# Patient Record
Sex: MALE | Race: WHITE | NOT HISPANIC OR LATINO | Employment: STUDENT | URBAN - METROPOLITAN AREA
[De-identification: names, ages, dates, MRNs, and addresses within clinical notes are randomized per-mention and may not be internally consistent; named-entity substitution may affect disease eponyms.]

---

## 2023-05-03 ENCOUNTER — HOSPITAL ENCOUNTER (OUTPATIENT)
Dept: GENERAL RADIOLOGY | Facility: CLINIC | Age: 18
Discharge: HOME | End: 2023-05-03
Attending: PLASTIC SURGERY | Admitting: PLASTIC SURGERY
Payer: COMMERCIAL

## 2023-05-03 DIAGNOSIS — S62.336A DISPLACED FRACTURE OF NECK OF FIFTH METACARPAL BONE, RIGHT HAND, INITIAL ENCOUNTER FOR CLOSED FRACTURE: ICD-10-CM

## 2023-05-03 PROCEDURE — 73130 X-RAY EXAM OF HAND: CPT | Mod: 26 | Performed by: RADIOLOGY

## 2023-05-03 PROCEDURE — 73130 X-RAY EXAM OF HAND: CPT | Mod: RT

## 2024-01-24 ENCOUNTER — OFFICE VISIT (OUTPATIENT)
Dept: FAMILY MEDICINE | Facility: CLINIC | Age: 19
End: 2024-01-24
Payer: COMMERCIAL

## 2024-01-24 VITALS
BODY MASS INDEX: 27.83 KG/M2 | WEIGHT: 210 LBS | RESPIRATION RATE: 16 BRPM | OXYGEN SATURATION: 98 % | HEIGHT: 73 IN | DIASTOLIC BLOOD PRESSURE: 77 MMHG | TEMPERATURE: 98.3 F | SYSTOLIC BLOOD PRESSURE: 120 MMHG | HEART RATE: 74 BPM

## 2024-01-24 DIAGNOSIS — Z13.228 SCREENING FOR METABOLIC DISORDER: ICD-10-CM

## 2024-01-24 DIAGNOSIS — Z13.220 LIPID SCREENING: ICD-10-CM

## 2024-01-24 DIAGNOSIS — Z00.00 ROUTINE GENERAL MEDICAL EXAMINATION AT A HEALTH CARE FACILITY: Primary | ICD-10-CM

## 2024-01-24 PROCEDURE — 80061 LIPID PANEL: CPT | Performed by: FAMILY MEDICINE

## 2024-01-24 PROCEDURE — 80048 BASIC METABOLIC PNL TOTAL CA: CPT | Performed by: FAMILY MEDICINE

## 2024-01-24 ASSESSMENT — ENCOUNTER SYMPTOMS
NERVOUS/ANXIOUS: 0
DYSURIA: 0
HEMATOCHEZIA: 0
FEVER: 0
ABDOMINAL PAIN: 0
MYALGIAS: 0
JOINT SWELLING: 0
HEADACHES: 0
HEMATURIA: 0
DIZZINESS: 0
EYE PAIN: 0
SHORTNESS OF BREATH: 0
CHILLS: 0
SORE THROAT: 0
BREAST MASS: 0
WEAKNESS: 0
FREQUENCY: 0
COUGH: 0
PALPITATIONS: 0
HEARTBURN: 0
NAUSEA: 0
PARESTHESIAS: 0
CONSTIPATION: 0
DIARRHEA: 0
ARTHRALGIAS: 0

## 2024-01-24 NOTE — PROGRESS NOTES
"Preventive Care Visit  [facility]  Gregg Robertson MD, Family Medicine  Jan 24, 2024      SUBJECTIVE:   Gretel is a 18 year old, presenting for the following:  Physical      Healthy Habits:     Getting at least 3 servings of Calcium per day:  Yes    Bi-annual eye exam:  NO    Dental care twice a year:  Yes    Sleep apnea or symptoms of sleep apnea:  None    Diet:  Regular (no restrictions)    Frequency of exercise:  6-7 days/week    Duration of exercise:  Greater than 60 minutes    Taking medications regularly:  Yes    Medication side effects:  None    Additional concerns today:  Yes    # Health Maintenance  New Patient  Establish Care  - BP:   BP Readings from Last 3 Encounters:   01/24/24 120/77   - Cholesterol: pending  No results for input(s): \"CHOL\", \"HDL\", \"LDL\", \"TRIG\" in the last 45409 hours.The ASCVD Risk score (Ranulfo ARIAS, et al., 2019) failed to calculate for the following reasons:    The 2019 ASCVD risk score is only valid for ages 40 to 79  - Diabetes Screening: pending  - Lung Cancer Screening: not indicated  55-81yo w/30py smoking history and currently smoking OR quit within past 15 years:  Low dose CT annually and discontinued once a person has been 15 years tobacco free  - (+) seatbelt use, (+) helmet, (+) smoke detector  - Feels safe at home, denies verbal/physical/emotional abuse in past year: yes      PROBLEMS TO ADD ON...  No      Social History     Tobacco Use    Smoking status: Never    Smokeless tobacco: Never   Substance Use Topics    Alcohol use: Yes     Comment: rare       Last PSA: No results found for: \"PSA\"    Reviewed orders with patient. Reviewed health maintenance and updated orders accordingly - Yes      Reviewed and updated as needed this visit by clinical staff   Tobacco  Allergies  Meds  Problems  Med Hx  Surg Hx  Fam Hx          Reviewed and updated as needed this visit by Provider   Tobacco  Allergies  Meds  Problems  Med Hx  Surg Hx  Fam Hx          History " "reviewed. No pertinent past medical history.   History reviewed. No pertinent surgical history.  Review of Systems   Constitutional:  Negative for chills and fever.   HENT:  Negative for congestion, ear pain, hearing loss and sore throat.    Eyes:  Negative for pain and visual disturbance.   Respiratory:  Negative for cough and shortness of breath.    Cardiovascular:  Negative for chest pain and palpitations.   Gastrointestinal:  Negative for abdominal pain, constipation, diarrhea and nausea.   Genitourinary:  Negative for dysuria, frequency, genital sores, hematuria, pelvic pain, penile discharge, urgency, vaginal bleeding and vaginal discharge.   Musculoskeletal:  Negative for arthralgias, joint swelling and myalgias.   Skin:  Negative for rash.   Neurological:  Negative for dizziness, weakness and headaches.   Psychiatric/Behavioral:  The patient is not nervous/anxious.        OBJECTIVE:   /77 (BP Location: Left arm, Patient Position: Sitting, Cuff Size: Adult Large)   Pulse 74   Temp 98.3  F (36.8  C) (Temporal)   Resp 16   Ht 1.85 m (6' 0.84\")   Wt 95.3 kg (210 lb)   SpO2 98%   BMI 27.83 kg/m     Estimated body mass index is 27.83 kg/m  as calculated from the following:    Height as of this encounter: 1.85 m (6' 0.84\").    Weight as of this encounter: 95.3 kg (210 lb).  Physical Exam  GENERAL: alert and no distress  NECK: no adenopathy, no asymmetry, masses, or scars  RESP: lungs clear to auscultation - no rales, rhonchi or wheezes  CV: regular rate and rhythm, normal S1 S2, no S3 or S4, no murmur, click or rub, no peripheral edema  ABDOMEN: soft, nontender, no hepatosplenomegaly, no masses and bowel sounds normal  MS: no gross musculoskeletal defects noted, no edema    Diagnostic Test Results:  Labs reviewed in Epic    ASSESSMENT/PLAN:   Gretel was seen today for physical.    Diagnoses and all orders for this visit:    Routine general medical examination at a health care facility    Lipid " screening  -     Lipid Profile; Future    Screening for metabolic disorder  -     Basic metabolic panel; Future          Patient has been advised of split billing requirements and indicates understanding: Yes      Counseling  Reviewed preventive health counseling, as reflected in patient instructions        Gretel Eldridge reports that Gretel Eldridge has never smoked. Gretel Eldridge has never used smokeless tobacco.             Signed Electronically by: Gregg Robertson MD  Answers submitted by the patient for this visit:  Annual Preventive Visit (Submitted on 1/24/2024)  Chief Complaint: Annual Exam:  Blood in stool: No  heartburn: No  peripheral edema: No  mood changes: No  Skin sensation changes: No  tenderness: No  breast mass: No  breast discharge: No  impotence: No

## 2024-01-25 LAB
ANION GAP SERPL CALCULATED.3IONS-SCNC: 10 MMOL/L (ref 7–15)
BUN SERPL-MCNC: 14.8 MG/DL (ref 6–20)
CALCIUM SERPL-MCNC: 9.4 MG/DL (ref 8.6–10)
CHLORIDE SERPL-SCNC: 101 MMOL/L (ref 98–107)
CHOLEST SERPL-MCNC: 115 MG/DL
CREAT SERPL-MCNC: 0.93 MG/DL (ref 0.51–1.17)
DEPRECATED HCO3 PLAS-SCNC: 26 MMOL/L (ref 22–29)
EGFRCR SERPLBLD CKD-EPI 2021: NORMAL ML/MIN/{1.73_M2}
FASTING STATUS PATIENT QL REPORTED: NO
GLUCOSE SERPL-MCNC: 90 MG/DL (ref 70–99)
HDLC SERPL-MCNC: 44 MG/DL
LDLC SERPL CALC-MCNC: 62 MG/DL
NONHDLC SERPL-MCNC: 71 MG/DL
POTASSIUM SERPL-SCNC: 4.6 MMOL/L (ref 3.4–5.3)
SODIUM SERPL-SCNC: 137 MMOL/L (ref 135–145)
TRIGL SERPL-MCNC: 47 MG/DL

## 2024-04-22 DIAGNOSIS — M25.571 RIGHT ANKLE PAIN, UNSPECIFIED CHRONICITY: Primary | ICD-10-CM

## 2024-04-22 NOTE — TELEPHONE ENCOUNTER
DIAGNOSIS: (R) ankle    APPOINTMENT DATE: 4/26/2024   NOTES STATUS DETAILS   OFFICE NOTE from referring provider Internal Gregg Robertson MD    OV 1/24/2024  Complete  In EPIC

## 2024-04-24 NOTE — PROGRESS NOTES
[facility]  Sports Medicine Clinic  Clinics and Surgery Center           SUBJECTIVE       Gretel Eldridge is a 18 year old male presenting to clinic today for right ankle pain.    Background:   Occupation: Student at the [employer]     Injury (Y/N): Unknown   Work Comp (Y/N): No   Date of injury: 2 years ago   Mechanism of Injury: Ankle sprain during soccer     Duration of symptoms: 1 month    Aggravating factors: Squatting and going down stairs    Relieving Factors: Activity modification    Prior Evaluation: No    Previous Surgery on the area (Y/N): No    Physical Therapy (Previous/Current/None): No     Physical Activity/Exercise (What, How Often): Power lift- 5 times a week       PMH, Medications and Allergies were reviewed and updated as needed.    ROS:  As noted above otherwise negative.    There is no problem list on file for this patient.      Current Outpatient Medications   Medication Sig Dispense Refill    ISOtretinoin (ACCUTANE) 40 MG capsule               OBJECTIVE:       Vitals: There were no vitals filed for this visit.  BMI: There is no height or weight on file to calculate BMI.    Gen:  Well nourished and in no acute distress  HEENT: Extraocular movement intact  Neck: Supple  Pulm:  Breathing Comfortably. No increased respiratory effort.  Psych: Euthymic. Appropriately answers questions    MSK: Bilateral ankles without evidence of an effusion or erythema.  No discernible tenderness to palpation about the anterior, medial, lateral, or posterior ankle.  Plantarflexion, inversion, and eversion are appropriate.  Dorsiflexion however is bilaterally limited, without evidence of mechanical obstruction.  Posterior hypertonicity of the gastroc and Achilles tendon is present.  Mild pes planus also noted.  Negative anterior drawer, talar tilt, dorsiflexion/eversion, tib-fib squeeze, and calcaneal squeeze.        Study Result    Narrative & Impression   3 views right ankle radiographs 4/26/2024 4:03 PM      History: Right ankle pain, unspecified chronicity      Comparison: None     Findings:     Standing AP, oblique, and lateral  views of the right ankle were  obtained.      No acute osseous abnormality.       Ankle mortise and syndesmosis are congruent on this weight bearing  study.      Soft tissue is unremarkable.                                                                      Impression:  1. No acute osseous abnormality.  2. No substantial degenerative change.             ASSESSMENT and PLAN:     Gretel was seen today for pain.    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  X-rays were obtained today and did not see any evidence of a mechanical block, ossicle, or anterior/posterior impingement.  X-rays were discussed with the patient in detail.  The patient's anterior impingement does seem more mobility related with decreased flexibility of the soft tissue structures of the posterior lower leg.  At this point, given the fact the patient is a power  and looking for noninvasive measures to increase his mobility, I have recommended physical therapy.  Order has been placed.  I have instructed the patient to this increase mobility can take time for him to notice any significant improvement.  I am fine with him continuing to squat, but I do recommend lifting shoes with a heel elevated, as well as a platform, to prevent excessive dorsiflexion of the ankle while going into the squat while still allowing him to make progress with squatting.  I would like to see the patient back in roughly 6 weeks to see how he is doing from an objective standpoint with his mobility and flexibility.  If he is having any increasing pain, or failing to progress  with physical therapy modalities, I do think advanced imaging would be reasonable.  All questions have been answered.    Was a pleasure seeing the patient today.    Options for treatment and/or follow-up care were reviewed with the patient was actively involved in the decision making process. Patient verbalized understanding and was in agreement with the plan.    Hao Garsia DO  , Sports Medicine  Department of Family Medicine and [facility]

## 2024-04-26 ENCOUNTER — PRE VISIT (OUTPATIENT)
Dept: ORTHOPEDICS | Facility: CLINIC | Age: 19
End: 2024-04-26

## 2024-04-26 ENCOUNTER — ANCILLARY PROCEDURE (OUTPATIENT)
Dept: GENERAL RADIOLOGY | Facility: CLINIC | Age: 19
End: 2024-04-26
Attending: STUDENT IN AN ORGANIZED HEALTH CARE EDUCATION/TRAINING PROGRAM
Payer: COMMERCIAL

## 2024-04-26 ENCOUNTER — OFFICE VISIT (OUTPATIENT)
Dept: ORTHOPEDICS | Facility: CLINIC | Age: 19
End: 2024-04-26
Attending: FAMILY MEDICINE
Payer: COMMERCIAL

## 2024-04-26 DIAGNOSIS — M25.872 IMPINGEMENT OF ANKLE JOINTS OF BOTH LOWER EXTREMITIES: Primary | ICD-10-CM

## 2024-04-26 DIAGNOSIS — M25.571 RIGHT ANKLE PAIN, UNSPECIFIED CHRONICITY: ICD-10-CM

## 2024-04-26 DIAGNOSIS — M25.871 IMPINGEMENT OF ANKLE JOINTS OF BOTH LOWER EXTREMITIES: Primary | ICD-10-CM

## 2024-04-26 PROCEDURE — 99203 OFFICE O/P NEW LOW 30 MIN: CPT | Performed by: STUDENT IN AN ORGANIZED HEALTH CARE EDUCATION/TRAINING PROGRAM

## 2024-04-26 PROCEDURE — 73610 X-RAY EXAM OF ANKLE: CPT | Mod: RT | Performed by: RADIOLOGY

## 2024-04-26 RX ORDER — ISOTRETINOIN 40 MG/1
CAPSULE ORAL
COMMUNITY
Start: 2024-02-02

## 2024-04-26 NOTE — PATIENT INSTRUCTIONS
[facility] Outpatient Physical Therapy Locations    To schedule an appointment please call our scheduling department at [phone]    To fax a referral to be scheduled fax to [phone]    [place]: [address], [facility]: [address] [facility]: [address], [facility]: [address], [facility]: [address], [facility]: [address] [facility]: [address] [place], [place]  [place]: [address] [address] [facility] , [address], [facility]: [address] , [address], [facility]: [address] [facility]: [address], [facility]: [address], [facility]: [address], [facility]: [address], [facility]: [address] [place]: [address] [facility]: [facility]: [address], [facility]: [address], [facility]: [address], [place], [place]  [place]: [address] [facility]: [address] [place] [address]: [address], [facility]: [address] [facility] [address], [place]  [place]: [address], [facility]: [address], [facility]: [address] [facility] [place], [place]  [facility]: [address] [place], [place]  [facility]: [address] [place] [address]  [place]: [address] [facility], [address], [facility] [address] [facility],  [facility]: [address] [place], [place]

## 2024-04-26 NOTE — LETTER
4/26/2024      RE: Gretel Eldridge  [address]  [place] [address]     Dear Colleague,    Thank you for referring your patient, Gretel Eldridge, to the [facility] SPORTS MEDICINE CLINIC [place]. Please see a copy of my visit note below.    [facility]  Sports Medicine Clinic  Clinics and Surgery Center           SUBJECTIVE       Gretel Eldridge is a 18 year old male presenting to clinic today for right ankle pain.    Background:   Occupation: Student at the [employer]     Injury (Y/N): Unknown   Work Comp (Y/N): No   Date of injury: 2 years ago   Mechanism of Injury: Ankle sprain during soccer     Duration of symptoms: 1 month    Aggravating factors: Squatting and going down stairs    Relieving Factors: Activity modification    Prior Evaluation: No    Previous Surgery on the area (Y/N): No    Physical Therapy (Previous/Current/None): No     Physical Activity/Exercise (What, How Often): Power lift- 5 times a week       PMH, Medications and Allergies were reviewed and updated as needed.    ROS:  As noted above otherwise negative.    There is no problem list on file for this patient.      Current Outpatient Medications   Medication Sig Dispense Refill     ISOtretinoin (ACCUTANE) 40 MG capsule               OBJECTIVE:       Vitals: There were no vitals filed for this visit.  BMI: There is no height or weight on file to calculate BMI.    Gen:  Well nourished and in no acute distress  HEENT: Extraocular movement intact  Neck: Supple  Pulm:  Breathing Comfortably. No increased respiratory effort.  Psych: Euthymic. Appropriately answers questions    MSK: Bilateral ankles without evidence of an effusion or erythema.  No discernible tenderness to palpation about the anterior, medial, lateral, or posterior ankle.  Plantarflexion, inversion, and eversion are appropriate.  Dorsiflexion however is bilaterally limited, without evidence of mechanical obstruction.  Posterior hypertonicity of the  gastroc and Achilles tendon is present.  Mild pes planus also noted.  Negative anterior drawer, talar tilt, dorsiflexion/eversion, tib-fib squeeze, and calcaneal squeeze.        Study Result    Narrative & Impression   3 views right ankle radiographs 4/26/2024 4:03 PM     History: Right ankle pain, unspecified chronicity      Comparison: None     Findings:     Standing AP, oblique, and lateral  views of the right ankle were  obtained.      No acute osseous abnormality.       Ankle mortise and syndesmosis are congruent on this weight bearing  study.      Soft tissue is unremarkable.                                                                      Impression:  1. No acute osseous abnormality.  2. No substantial degenerative change.             ASSESSMENT and PLAN:     Gretel was seen today for pain.    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  X-rays were obtained today and did not see any evidence of a mechanical block, ossicle, or anterior/posterior impingement.  X-rays were discussed with the patient in detail.  The patient's anterior impingement does seem more mobility related with decreased flexibility of the soft tissue structures of the posterior lower leg.  At this point, given the fact the patient is a power  and looking for noninvasive measures to increase his mobility, I have recommended physical therapy.  Order has been placed.  I have instructed the patient to this increase mobility can take time for him to notice any significant improvement.  I am fine with him continuing to squat, but I do recommend lifting shoes with a heel elevated, as well as a platform, to prevent excessive dorsiflexion of the ankle while going into the  squat while still allowing him to make progress with squatting.  I would like to see the patient back in roughly 6 weeks to see how he is doing from an objective standpoint with his mobility and flexibility.  If he is having any increasing pain, or failing to progress with physical therapy modalities, I do think advanced imaging would be reasonable.  All questions have been answered.    Was a pleasure seeing the patient today.    Options for treatment and/or follow-up care were reviewed with the patient was actively involved in the decision making process. Patient verbalized understanding and was in agreement with the plan.    Hao Garsia DO  , Sports Medicine  Department of Family Medicine and [facility]      Again, thank you for allowing me to participate in the care of your patient.      Sincerely,    Hao Garsia DO

## 2024-05-01 ASSESSMENT — ACTIVITIES OF DAILY LIVING (ADL)
SQUATTING: A LITTLE BIT OF DIFFICULTY
PERFORMING_HEAVY_ACTIVITIES_AROUND_YOUR_HOME: NO DIFFICULTY
LEFS_RAW_SCORE: 0
RUNNING_ON_EVEN_GROUND: NO DIFFICULTY
MAKING_SHARP_TURNS_WHILE_RUNNING_FAST: NO DIFFICULTY
STANDING_FOR_1_HOUR: NO DIFFICULTY
RUNNING_ON_UNEVEN_GROUND: NO DIFFICULTY
WALKING_2_BLOCKS: NO DIFFICULTY
PUTTING_ON_YOUR_SHOES_OR_SOCKS: NO DIFFICULTY
GETTING_INTO_AND_OUT_OF_A_BATH: NO DIFFICULTY
PLEASE_INDICATE_YOR_PRIMARY_REASON_FOR_REFERRAL_TO_THERAPY:: FOOT AND/OR ANKLE
ANY_OF_YOUR_USUAL_WORK,_HOUSEWORK_OR_SCHOOL_ACTIVITIES: NO DIFFICULTY
WALKING_A_MILE: NO DIFFICULTY
PERFORMING_LIGHT_ACTIVITIES_AROUND_YOUR_HOME: NO DIFFICULTY
ROLLING_OVER_IN_BED: NO DIFFICULTY
SHOPPING: NO DIFFICULTY
LIFTING_AN_OBJECT,_LIKE_A_BAG_OF_GROCERIES_FROM_THE_FLOOR: NO DIFFICULTY
WALKING_BETWEEN_ROOMS: NO DIFFICULTY
YOUR_USUAL_HOBBIES,_RECREATIONAL_OR_SPORTING_ACTIVITIES: A LITTLE BIT OF DIFFICULTY
LEFS_SCORE(%): 0
GOING_UP_OR_DOWN_10_STAIRS: A LITTLE BIT OF DIFFICULTY
GETTING_INTO_OR_OUT_OF_A_CAR: NO DIFFICULTY
SITTING_FOR_1_HOUR: NO DIFFICULTY

## 2024-05-02 ENCOUNTER — THERAPY VISIT (OUTPATIENT)
Dept: PHYSICAL THERAPY | Facility: CLINIC | Age: 19
End: 2024-05-02
Payer: COMMERCIAL

## 2024-05-02 DIAGNOSIS — M25.871 IMPINGEMENT OF ANKLE JOINTS OF BOTH LOWER EXTREMITIES: Primary | ICD-10-CM

## 2024-05-02 DIAGNOSIS — M25.571 ANKLE PAIN, RIGHT: ICD-10-CM

## 2024-05-02 DIAGNOSIS — M25.872 IMPINGEMENT OF ANKLE JOINTS OF BOTH LOWER EXTREMITIES: Primary | ICD-10-CM

## 2024-05-02 PROCEDURE — 97530 THERAPEUTIC ACTIVITIES: CPT | Mod: GP | Performed by: PHYSICAL THERAPIST

## 2024-05-02 PROCEDURE — 97161 PT EVAL LOW COMPLEX 20 MIN: CPT | Mod: GP | Performed by: PHYSICAL THERAPIST

## 2024-05-02 PROCEDURE — 97110 THERAPEUTIC EXERCISES: CPT | Mod: GP | Performed by: PHYSICAL THERAPIST

## 2024-05-02 NOTE — PROGRESS NOTES
"PHYSICAL THERAPY EVALUATION  Type of Visit: Evaluation    See electronic medical record for Abuse and Falls Screening details.    Subjective   Patient reports to physical therapy for right ankle pain that is worse with knees over toes and to the right.  Feels like a sharp pain that is concentrated right in the \"crevice of the ankle\" only on the lateral side.  Unsure of previous ankle injury, remembers having an ankle sprain in HS, unsure of side.  He power lifts five days per week, mostly notices it with squatting, better with squatting shoes that have a lift in the heel.  Better with squatting shoes, not causing inflammation and pain with different technique.  History of R hip pain in anterior hip with squatting and soccer.  Barefoot style shoes.  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        Presenting condition or subjective complaint: Pain when ankle goes over toes and to the right.  Date of onset:      Relevant medical history:     Dates & types of surgery:      Prior diagnostic imaging/testing results: X-ray     Prior therapy history for the same diagnosis, illness or injury: No      Prior Level of Function  Transfers: Independent  Ambulation: Independent  ADL: Independent  IADL: Driving, Finances, Housekeeping, Laundry, Medication management, School    Living Environment  Social support:     Type of home: Apartment/condo   Stairs to enter the home: No       Ramp: No   Stairs inside the home: No       Help at home: None  Equipment owned:       Employment: Yes   Hobbies/Interests: [employer], [employer]    Patient goals for therapy: Squat painfree, not have occasional pain when going down stairs    Pain assessment: See objective evaluation for additional pain details     Objective   FOOT/ANKLE EVALUATION  PAIN: Pain Level at Rest: 0/10  Pain Level with Use: 5/10  Pain Location: ankle  Pain Quality: Sharp  Pain Frequency: " intermittent  Pain is Worst: daytime  Pain is Exacerbated By: squatting, going down stairs   Pain is Relieved By: squatting shoes, ankle mobilization with band, compensating  Pain Progression: Improved  INTEGUMENTARY (edema, incisions): WNL  POSTURE: WNL  GAIT:   Weightbearing Status: WBAT  Assistive Device(s): None  Gait Deviations: Pronation increased R  BALANCE/PROPRIOCEPTION: Single Leg Stance Eyes Open (seconds): >15 sec B   WEIGHT BEARING ALIGNMENT: Foot/Ankle WB Alignment:Pes planus R  Hip WB Alignment:WNL  NON-WEIGHTBEARING ALIGNMENT: WNL   ROM:   (Degrees) Left AROM Left PROM  Right AROM Right PROM   Ankle Dorsiflexion 5  5    Ankle Plantarflexion       Ankle Inversion 29  49    Ankle Eversion 39  41    Great Toe Flexion       Great Toe Extension       Pain:   End feel:     STRENGTH:   Pain: - none + mild ++ moderate +++ severe  Strength Scale: 0-5/5 Left Right   Ankle Dorsiflexion 5 5-   Ankle Plantarflexion 5 4+   Ankle Inversion 5 4   Ankle Eversion 5 4+   Great Toe Flexion 5 5   Great Toe Extension 5 4+   Anterior Tibialis     Posterior Tibialis     Peroneals     Extensor Digitorum     Gastroc/Soleus     R hip abd: 4/5, L 4+/5  FLEXIBILITY: Decreased gastroc R, Decreased soleus R  SPECIAL TESTS:    Left Right   Tinel Sign Negative  Negative    Piña Sign Negative  Negative    Windlass Test     Ligamentous Stability     Anterior Drawer   Negative,     Kleiger ER Test Negative,   Negative,     Longitudinal Arch Angle Test Negative,   Negative,     Talar Tilt Negative,   Negative,       FUNCTIONAL TESTS: Single Leg Squat: Anterior knee translation, Knee valgus, Hip internal rotation, and Improper use of glutes/hips  PALPATION:  TTP to right ATFL, CFL  JOINT MOBILITY:    Left Right   Tib-Fib Proximal WNL WNL   Tib-Fib Distal WNL WNL   Talocrural Hypomobile Hypomobile   Subtalar Hypermobile Hypermobile   Midtarsal     First Ray         Assessment & Plan   CLINICAL IMPRESSIONS  Medical Diagnosis:       Treatment Diagnosis:     Impression/Assessment: Patient is a 18 year old male with ankle complaints.  The following significant findings have been identified: Pain, Decreased ROM/flexibility, Decreased joint mobility, Decreased strength, Impaired muscle performance, and Decreased activity tolerance. These impairments interfere with their ability to perform recreational activities and community mobility as compared to previous level of function.     Clinical Decision Making (Complexity):  Clinical Presentation: Stable/Uncomplicated  Clinical Presentation Rationale: based on medical and personal factors listed in PT evaluation  Clinical Decision Making (Complexity): Low complexity    PLAN OF CARE  Treatment Interventions:  Modalities: Cryotherapy  Interventions: Manual Therapy, Neuromuscular Re-education, Therapeutic Activity, Therapeutic Exercise, Self-Care/Home Management    Long Term Goals            Frequency of Treatment:    Duration of Treatment:      Recommended Referrals to Other Professionals:  No further referral needed   Education Assessment:        Risks and benefits of evaluation/treatment have been explained.   Patient/Family/caregiver agrees with Plan of Care.     Evaluation Time:         Present: Not applicable     Signing Clinician: Irina Rodarte, PT

## 2024-05-06 PROBLEM — M25.872: Status: ACTIVE | Noted: 2024-05-06

## 2024-05-06 PROBLEM — M25.571 ANKLE PAIN, RIGHT: Status: ACTIVE | Noted: 2024-05-06

## 2024-05-06 PROBLEM — M25.871: Status: ACTIVE | Noted: 2024-05-06

## 2024-05-16 ENCOUNTER — THERAPY VISIT (OUTPATIENT)
Dept: PHYSICAL THERAPY | Facility: CLINIC | Age: 19
End: 2024-05-16
Attending: STUDENT IN AN ORGANIZED HEALTH CARE EDUCATION/TRAINING PROGRAM
Payer: COMMERCIAL

## 2024-05-16 DIAGNOSIS — M25.571 ANKLE PAIN, RIGHT: ICD-10-CM

## 2024-05-16 DIAGNOSIS — M25.871 IMPINGEMENT OF ANKLE JOINTS OF BOTH LOWER EXTREMITIES: Primary | ICD-10-CM

## 2024-05-16 DIAGNOSIS — M25.872 IMPINGEMENT OF ANKLE JOINTS OF BOTH LOWER EXTREMITIES: Primary | ICD-10-CM

## 2024-05-16 PROCEDURE — 97140 MANUAL THERAPY 1/> REGIONS: CPT | Mod: GP | Performed by: PHYSICAL THERAPIST

## 2024-05-16 PROCEDURE — 97110 THERAPEUTIC EXERCISES: CPT | Mod: GP | Performed by: PHYSICAL THERAPIST

## 2024-05-16 PROCEDURE — 97112 NEUROMUSCULAR REEDUCATION: CPT | Mod: GP | Performed by: PHYSICAL THERAPIST

## 2024-05-23 ENCOUNTER — THERAPY VISIT (OUTPATIENT)
Dept: PHYSICAL THERAPY | Facility: CLINIC | Age: 19
End: 2024-05-23
Attending: STUDENT IN AN ORGANIZED HEALTH CARE EDUCATION/TRAINING PROGRAM
Payer: COMMERCIAL

## 2024-05-23 DIAGNOSIS — M25.571 CHRONIC PAIN OF RIGHT ANKLE: ICD-10-CM

## 2024-05-23 DIAGNOSIS — M25.871 IMPINGEMENT OF ANKLE JOINTS OF BOTH LOWER EXTREMITIES: Primary | ICD-10-CM

## 2024-05-23 DIAGNOSIS — G89.29 CHRONIC PAIN OF RIGHT ANKLE: ICD-10-CM

## 2024-05-23 DIAGNOSIS — M25.872 IMPINGEMENT OF ANKLE JOINTS OF BOTH LOWER EXTREMITIES: Primary | ICD-10-CM

## 2024-05-23 PROCEDURE — 97112 NEUROMUSCULAR REEDUCATION: CPT | Mod: 59

## 2024-05-23 PROCEDURE — 97140 MANUAL THERAPY 1/> REGIONS: CPT | Mod: 59

## 2024-05-23 PROCEDURE — 97530 THERAPEUTIC ACTIVITIES: CPT | Mod: GP

## 2024-05-29 ENCOUNTER — THERAPY VISIT (OUTPATIENT)
Dept: PHYSICAL THERAPY | Facility: CLINIC | Age: 19
End: 2024-05-29
Payer: COMMERCIAL

## 2024-05-29 DIAGNOSIS — M25.871 IMPINGEMENT OF ANKLE JOINTS OF BOTH LOWER EXTREMITIES: Primary | ICD-10-CM

## 2024-05-29 DIAGNOSIS — M25.872 IMPINGEMENT OF ANKLE JOINTS OF BOTH LOWER EXTREMITIES: Primary | ICD-10-CM

## 2024-05-29 DIAGNOSIS — G89.29 CHRONIC PAIN OF RIGHT ANKLE: ICD-10-CM

## 2024-05-29 DIAGNOSIS — M25.571 CHRONIC PAIN OF RIGHT ANKLE: ICD-10-CM

## 2024-05-29 PROCEDURE — 97112 NEUROMUSCULAR REEDUCATION: CPT | Mod: GP

## 2024-05-29 PROCEDURE — 97110 THERAPEUTIC EXERCISES: CPT | Mod: GP

## 2024-05-29 PROCEDURE — 97140 MANUAL THERAPY 1/> REGIONS: CPT | Mod: GP

## 2024-06-12 ENCOUNTER — THERAPY VISIT (OUTPATIENT)
Dept: PHYSICAL THERAPY | Facility: CLINIC | Age: 19
End: 2024-06-12
Payer: COMMERCIAL

## 2024-06-12 DIAGNOSIS — M25.571 CHRONIC PAIN OF RIGHT ANKLE: ICD-10-CM

## 2024-06-12 DIAGNOSIS — M25.871 IMPINGEMENT OF ANKLE JOINTS OF BOTH LOWER EXTREMITIES: Primary | ICD-10-CM

## 2024-06-12 DIAGNOSIS — G89.29 CHRONIC PAIN OF RIGHT ANKLE: ICD-10-CM

## 2024-06-12 DIAGNOSIS — M25.872 IMPINGEMENT OF ANKLE JOINTS OF BOTH LOWER EXTREMITIES: Primary | ICD-10-CM

## 2024-06-12 PROCEDURE — 97530 THERAPEUTIC ACTIVITIES: CPT | Mod: GP

## 2024-06-12 PROCEDURE — 97110 THERAPEUTIC EXERCISES: CPT | Mod: GP

## 2024-06-12 PROCEDURE — 97112 NEUROMUSCULAR REEDUCATION: CPT | Mod: GP

## 2024-06-12 NOTE — PROGRESS NOTES
06/12/24 0500   Appointment Info   Signing clinician's name / credentials Justin Garnica DPT   Total/Authorized Visits E&T 8   Visits Used 5   Medical Diagnosis Impingement of ankle joints of both lower extremities (M25.871, M25.872)   PT Tx Diagnosis R ankle pain   Other pertinent information decrease TC joint mobility, excessive inv/ev ROM, decreased hip stability   Progress Note/Certification   Onset of illness/injury or Date of Surgery 04/26/24   Therapy Frequency every 1-2 weeks   Predicted Duration 12 weeks   Progress Note Due Date 07/04/24   Progress Note Completed Date 05/02/24       Present No   PT Goal 1   Goal Identifier Strenght/pain   Goal Description Pt will squat 300lbs without R ankle pain   Rationale to maximize safety and independence with performance of ADLs and functional tasks   Goal Progress Able to lift over 300lbs with minimal R ankle pain   Target Date 07/24/24   Date Met 06/12/24   Subjective Report   Subjective Report Pt states ankles are feeling good. He was sick last week so didn't go to the gym much or do his exercises.   Objective Measure 1   Objective Measure DF ROM   Details B: AROM neutral, PROM 3   Objective Measure 2   Objective Measure CKC DF ROM   Details 10 cm B   Therapeutic Procedure/Exercise   Therapeutic Procedures: strength, endurance, ROM, flexibility minutes (93344) 13   Ther Proc 1 Split squat with front foot floating heel   Ther Proc 1 - Details 3x12 ea   PTRx Ther Proc 2  justins   PTRx Ther Proc 2 - Details 3x30 sec ea   Skilled Intervention verbal cueing/demo   Patient Response/Progress No increase in pain, sufficient challenge   Therapeutic Procedures Ther Proc 2;Ther Proc 3   Therapeutic Activity   Therapeutic Activities: dynamic activities to improve functional performance minutes (68140) 14   Therapeutic Activities Ther Act 3;Ther Act 4   Ther Act 1 review of status, POC, HEP, outcome tool   Ther Act 2 Lateral stepdown   Ther Act 2  "- Details x10 ea 12\" , x10 ea 11\" w/ black TB around ankle to prevent arch collapse/pronation   Skilled Intervention verbal cueing/demo   Patient Response/Progress slight arch collapse that is corrected with band during lateral stepdown   Neuromuscular Re-education   Neuromuscular re-ed of mvmt, balance, coord, kinesthetic sense, posture, proprioception minutes (82310) 8   Neuro Re-ed 1 SLS inverted bosu ball   Neuro Re-ed 1 - Details x30 sec ea   Neuro Re-ed 2 SLS inverted bosu 10 lb KB switch and around the worlds   Neuro Re-ed 2 - Details 2x15 switches/around the worlds ea   Skilled Intervention ankle stability   Patient Response/Progress Improved stability demonstrated this session   Education   Learner/Method Patient;No Barriers to Learning   Plan   Home program given PTRX   Plan for next session discharged   Total Session Time   Timed Code Treatment Minutes 35   Total Treatment Time (sum of timed and untimed services) 35       DISCHARGE  Reason for Discharge: Pt has progressed well with physical therapy. He is able to lift his desired amount of weight at the gym with minimal ankle pain. He has improved his glute and ankle strength/stability, as well as his ankle mobility. He will continue SL stability exercise and ankle mobility a few times a week at home to prevent his pain from re-occurring. LEFS score improved slightly from 77 to 78/80 indicating nearly normal function.     Equipment Issued: none    Discharge Plan: Patient to continue home program.    Referring Provider:  Hao Garsia    "

## 2024-06-20 NOTE — PROGRESS NOTES
[facility]  Sports Medicine Clinic  Cl[facility]ics [facility]           SUBJECTIVE       Gretel Eldridge is a 18 year old male presenting to clinic today for follow-up of the bilateral ankle.  He is overall doing well.  Does notice some improvement with physical therapy.  Pain has reduced as well since starting physical therapy.  He has adjusted his lifts, as well as using heel lifts while doing squats and lunges.  No instability.  No pain at night    4/26/24: Impingement of ankle joints of both lower extremities  -     Physical Therapy  Referral; Future     Other orders  -     Orthopedic  Referral        18-year-old male power , presenting to clinic today with ongoing right ankle limited range of motion.  X-rays were obtained today and did not see any evidence of a mechanical block, ossicle, or anterior/posterior impingement.  X-rays were discussed with the patient in detail.  The patient's anterior impingement does seem more mobility related with decreased flexibility of the soft tissue structures of the posterior lower leg.  At this point, given the fact the patient is a power  and looking for noninvasive measures to increase his mobility, I have recommended physical therapy.  Order has been placed.  I have instructed the patient to this increase mobility can take time for him to notice any significant improvement.  I am fine with him continuing to squat, but I do recommend lifting shoes with a heel elevated, as well as a platform, to prevent excessive dorsiflexion of the ankle while going into the squat while still allowing him to make progress with squatting.  I would like to see the patient back in roughly 6 weeks to see how he is doing from an objective standpoint with his mobility and flexibility.  If he is having any increasing pain, or failing to progress with physical therapy modalities, I do think advanced imaging would be reasonable.  All questions have been  answered.    PMH, Medications and Allergies were reviewed and updated as needed.    ROS:  As noted above otherwise negative.    Patient Active Problem List   Diagnosis   (none) - all problems resolved or deleted       Current Outpatient Medications   Medication Sig Dispense Refill    ISOtretinoin (ACCUTANE) 40 MG capsule               OBJECTIVE:       Vitals: There were no vitals filed for this visit.  BMI: There is no height or weight on file to calculate BMI.    Gen:  Well nourished and in no acute distress  HEENT: Extraocular movement intact  Neck: Supple  Pulm:  Breathing Comfortably. No increased respiratory effort.  Psych: Euthymic. Appropriately answers questions    MSK: Bilateral ankles without edema or effusions.  No discernible tenderness to palpation about the ankles.  Continued limited range of motion in dorsiflexion, although mildly improved from previous.  Negative talar tilt and anterior drawer bilaterally.            ASSESSMENT and PLAN:     Gretel was seen today for follow up.    Diagnoses and all orders for this visit:    Impingement of ankle joints of both lower extremities      18-year-old power  presenting to clinic for follow-up at the 4-week silver after starting physical therapy.  Patient has noticed progression in his mobility as well as a decrease of pain in the right ankle.  At this point the patient is hopeful to get discharged from physical therapy so he can continue it on his own.  He is also found the adjustments and modifications for his lifting to be very beneficial.  At this point the patient would like to continue PT on his own, follow-up with us as needed.  The patient will reach out to us over the next 4 to 6 weeks and let us know if he is not noticing any further improvement or continued pain, at which point we can order an MRI for the right ankle and then have the patient follow-up in clinic subsequently after the results.  All questions have been answered.  Earlier return  precautions have been advised.    Options for treatment and/or follow-up care were reviewed with the patient was actively involved in the decision making process. Patient verbalized understanding and was in agreement with the plan.    Hao Garsia DO  , Sports Medicine  Department of Family Medicine and [facility]

## 2024-06-21 ENCOUNTER — OFFICE VISIT (OUTPATIENT)
Dept: ORTHOPEDICS | Facility: CLINIC | Age: 19
End: 2024-06-21
Payer: COMMERCIAL

## 2024-06-21 DIAGNOSIS — M25.872 IMPINGEMENT OF ANKLE JOINTS OF BOTH LOWER EXTREMITIES: Primary | ICD-10-CM

## 2024-06-21 DIAGNOSIS — M25.871 IMPINGEMENT OF ANKLE JOINTS OF BOTH LOWER EXTREMITIES: Primary | ICD-10-CM

## 2024-06-21 PROCEDURE — 99213 OFFICE O/P EST LOW 20 MIN: CPT | Performed by: STUDENT IN AN ORGANIZED HEALTH CARE EDUCATION/TRAINING PROGRAM

## 2024-06-21 NOTE — LETTER
6/21/2024      RE: Gretel Eldridge  [address]  [facility] [address]     Dear Colleague,    Thank you for referring your patient, Gretel Eldridge, to the [facility] SP[facility]IC[facility] [place]. Please see a copy of my visit note below.    [facility]  Sports Medicine Clinic  Clinics and Surgery Center           SUBJECTIVE       Gretel Eldridge is a 18 year old male presenting to clinic today for follow-up of the bilateral ankle.  He is overall doing well.  Does notice some improvement with physical therapy.  Pain has reduced as well since starting physical therapy.  He has adjusted his lifts, as well as using heel lifts while doing squats and lunges.  No instability.  No pain at night    4/26/24: Impingement of ankle joints of both lower extremities  -     Physical Therapy  Referral; Future     Other orders  -     Orthopedic  Referral        18-year-old male power , presenting to clinic today with ongoing right ankle limited range of motion.  X-rays were obtained today and did not see any evidence of a mechanical block, ossicle, or anterior/posterior impingement.  X-rays were discussed with the patient in detail.  The patient's anterior impingement does seem more mobility related with decreased flexibility of the soft tissue structures of the posterior lower leg.  At this point, given the fact the patient is a power  and looking for noninvasive measures to increase his mobility, I have recommended physical therapy.  Order has been placed.  I have instructed the patient to this increase mobility can take time for him to notice any significant improvement.  I am fine with him continuing to squat, but I do recommend lifting shoes with a heel elevated, as well as a platform, to prevent excessive dorsiflexion of the ankle while going into the squat while still allowing him to make progress with squatting.  I would like to see the patient back in  roughly 6 weeks to see how he is doing from an objective standpoint with his mobility and flexibility.  If he is having any increasing pain, or failing to progress with physical therapy modalities, I do think advanced imaging would be reasonable.  All questions have been answered.    PMH, Medications and Allergies were reviewed and updated as needed.    ROS:  As noted above otherwise negative.    Patient Active Problem List   Diagnosis   (none) - all problems resolved or deleted       Current Outpatient Medications   Medication Sig Dispense Refill     ISOtretinoin (ACCUTANE) 40 MG capsule               OBJECTIVE:       Vitals: There were no vitals filed for this visit.  BMI: There is no height or weight on file to calculate BMI.    Gen:  Well nourished and in no acute distress  HEENT: Extraocular movement intact  Neck: Supple  Pulm:  Breathing Comfortably. No increased respiratory effort.  Psych: Euthymic. Appropriately answers questions    MSK: Bilateral ankles without edema or effusions.  No discernible tenderness to palpation about the ankles.  Continued limited range of motion in dorsiflexion, although mildly improved from previous.  Negative talar tilt and anterior drawer bilaterally.            ASSESSMENT and PLAN:     Gretel was seen today for follow up.    Diagnoses and all orders for this visit:    Impingement of ankle joints of both lower extremities      18-year-old power  presenting to clinic for follow-up at the 4-week silver after starting physical therapy.  Patient has noticed progression in his mobility as well as a decrease of pain in the right ankle.  At this point the patient is hopeful to get discharged from physical therapy so he can continue it on his own.  He is also found the adjustments and modifications for his lifting to be very beneficial.  At this point the patient would like to continue PT on his own, follow-up with us as needed.  The patient will reach out to us over the next 4 to  6 weeks and let us know if he is not noticing any further improvement or continued pain, at which point we can order an MRI for the right ankle and then have the patient follow-up in clinic subsequently after the results.  All questions have been answered.  Earlier return precautions have been advised.    Options for treatment and/or follow-up care were reviewed with the patient was actively involved in the decision making process. Patient verbalized understanding and was in agreement with the plan.    Hao Garsia DO  , Sports Medicine  Department of Family Medicine and [facility]

## 2024-07-01 ENCOUNTER — TRANSFERRED RECORDS (OUTPATIENT)
Dept: HEALTH INFORMATION MANAGEMENT | Facility: CLINIC | Age: 19
End: 2024-07-01
Payer: COMMERCIAL

## 2024-08-02 ENCOUNTER — TRANSFERRED RECORDS (OUTPATIENT)
Dept: HEALTH INFORMATION MANAGEMENT | Facility: CLINIC | Age: 19
End: 2024-08-02
Payer: COMMERCIAL

## 2024-09-04 ENCOUNTER — TRANSFERRED RECORDS (OUTPATIENT)
Dept: HEALTH INFORMATION MANAGEMENT | Facility: CLINIC | Age: 19
End: 2024-09-04
Payer: COMMERCIAL

## 2024-10-09 ENCOUNTER — TRANSFERRED RECORDS (OUTPATIENT)
Dept: HEALTH INFORMATION MANAGEMENT | Facility: CLINIC | Age: 19
End: 2024-10-09
Payer: COMMERCIAL

## 2025-01-21 ENCOUNTER — TRANSFERRED RECORDS (OUTPATIENT)
Dept: HEALTH INFORMATION MANAGEMENT | Facility: CLINIC | Age: 20
End: 2025-01-21
Payer: COMMERCIAL

## 2025-02-21 ENCOUNTER — TRANSFERRED RECORDS (OUTPATIENT)
Dept: HEALTH INFORMATION MANAGEMENT | Facility: CLINIC | Age: 20
End: 2025-02-21
Payer: COMMERCIAL

## 2025-02-25 ENCOUNTER — OFFICE VISIT (OUTPATIENT)
Dept: FAMILY MEDICINE | Facility: CLINIC | Age: 20
End: 2025-02-25
Payer: COMMERCIAL

## 2025-02-25 VITALS
HEIGHT: 73 IN | TEMPERATURE: 97.2 F | RESPIRATION RATE: 16 BRPM | SYSTOLIC BLOOD PRESSURE: 127 MMHG | DIASTOLIC BLOOD PRESSURE: 79 MMHG | OXYGEN SATURATION: 99 % | HEART RATE: 61 BPM | WEIGHT: 193 LBS | BODY MASS INDEX: 25.58 KG/M2

## 2025-02-25 DIAGNOSIS — Z00.00 WELLNESS EXAMINATION: Primary | ICD-10-CM

## 2025-02-25 RX ORDER — TRIAMCINOLONE ACETONIDE 1 MG/G
CREAM TOPICAL
COMMUNITY
Start: 2024-03-01

## 2025-02-25 SDOH — HEALTH STABILITY: PHYSICAL HEALTH: ON AVERAGE, HOW MANY DAYS PER WEEK DO YOU ENGAGE IN MODERATE TO STRENUOUS EXERCISE (LIKE A BRISK WALK)?: 5 DAYS

## 2025-02-25 ASSESSMENT — SOCIAL DETERMINANTS OF HEALTH (SDOH): HOW OFTEN DO YOU GET TOGETHER WITH FRIENDS OR RELATIVES?: MORE THAN THREE TIMES A WEEK

## 2025-02-25 NOTE — NURSING NOTE
"19 year old  Chief Complaint   Patient presents with    Physical     No concerns       Blood pressure 127/79, pulse 61, temperature 97.2  F (36.2  C), temperature source Skin, resp. rate 16, height 1.845 m (6' 0.64\"), weight 87.5 kg (193 lb), SpO2 99%. Body mass index is 25.72 kg/m .  Patient Active Problem List   Diagnosis   (none) - all problems resolved or deleted       Wt Readings from Last 2 Encounters:   02/25/25 87.5 kg (193 lb)   01/24/24 95.3 kg (210 lb)     BP Readings from Last 3 Encounters:   02/25/25 127/79   01/24/24 120/77         Current Outpatient Medications   Medication Sig Dispense Refill    ISOtretinoin (ACCUTANE) 40 MG capsule        No current facility-administered medications for this visit.       Social History     Tobacco Use    Smoking status: Never    Smokeless tobacco: Never   Substance Use Topics    Alcohol use: Yes     Comment: rare    Drug use: Never       Health Maintenance Due   Topic Date Due    ADVANCE CARE PLANNING  Never done    HIV SCREENING  Never done    HEPATITIS C SCREENING  Never done    MENINGITIS B IMMUNIZATION (2 of 2 - Bexsero SCDM 2-dose series) 02/18/2023    YEARLY PREVENTIVE VISIT  01/24/2025       No results found for: \"PAP\"      February 25, 2025 8:15 AM   "

## 2025-02-25 NOTE — PROGRESS NOTES
"Preventive Care Visit  [facility]  Gregg Robertson MD, Family Medicine  Feb 25, 2025      Assessment & Plan   Problem List Items Addressed This Visit    None  Visit Diagnoses       Wellness examination    -  Primary             Patient has been advised of split billing requirements and indicates understanding: Yes       BMI  Estimated body mass index is 25.72 kg/m  as calculated from the following:    Height as of this encounter: 1.845 m (6' 0.64\").    Weight as of this encounter: 87.5 kg (193 lb).       Counseling  Appropriate preventive services were addressed with this patient via screening, questionnaire, or discussion as appropriate for fall prevention, nutrition, physical activity, Tobacco-use cessation, social engagement, weight loss and cognition.  Checklist reviewing preventive services available has been given to the patient.  Reviewed patient's diet, addressing concerns and/or questions.   He is at risk for psychosocial distress and has been provided with information to reduce risk.     Gregg Robertson MD  8:36 AM, February 25, 2025        Cherelle Majano is a 19 year old, presenting for the following:  Physical (No concerns)         HPI  # Health Maintenance  - BP:   BP Readings from Last 3 Encounters:   02/25/25 127/79   01/24/24 120/77   - Cholesterol: previous labs reviewed  Recent Labs   Lab Test 01/24/24  1642   CHOL 115   HDL 44   LDL 62   TRIG 47   The ASCVD Risk score (Madison DK, et al., 2019) failed to calculate for the following reasons:    The 2019 ASCVD risk score is only valid for ages 40 to 79  - Diabetes Screening: previous labs reviewed  - Lung Cancer Screening: not indicated  55-79yo w/30py smoking history and currently smoking OR quit within past 15 years:  Low dose CT annually and discontinued once a person has been 15 years tobacco free  - (+) seatbelt use, (+) helmet, (+) smoke detector  - Feels safe at home, denies verbal/physical/emotional abuse in past year: yes      Health " Care Directive  Patient does not have a Health Care Directive        2/25/2025   General Health   How would you rate your overall physical health? Excellent   Feel stress (tense, anxious, or unable to sleep) To some extent   (!) STRESS CONCERN      2/25/2025   Nutrition   Three or more servings of calcium each day? Yes   Diet: Regular (no restrictions)   How many servings of fruit and vegetables per day? (!) 2-3   How many sweetened beverages each day? 0-1         2/25/2025   Exercise   Days per week of moderate/strenous exercise 5 days         2/25/2025   Social Factors   Frequency of gathering with friends or relatives More than three times a week   Worry food won't last until get money to buy more No   Food not last or not have enough money for food? No   Do you have housing? (Housing is defined as stable permanent housing and does not include staying ouside in a car, in a tent, in an abandoned building, in an overnight shelter, or couch-surfing.) Yes   Are you worried about losing your housing? No   Lack of transportation? No   Unable to get utilities (heat,electricity)? No         2/25/2025   Dental   Dentist two times every year? Yes            Today's PHQ-2 Score:       2/24/2025    10:44 AM   PHQ-2 ( 1999 [employer])   Q1: Little interest or pleasure in doing things 1   Q2: Feeling down, depressed or hopeless 1   PHQ-2 Score 2    Q1: Little interest or pleasure in doing things Several days   Q2: Feeling down, depressed or hopeless Several days   PHQ-2 Score 2       Patient-reported           2/25/2025   Substance Use   Alcohol more than 3/day or more than 7/wk No   Do you use any other substances recreationally? No     Social History     Tobacco Use    Smoking status: Never    Smokeless tobacco: Never   Substance Use Topics    Alcohol use: Yes     Comment: rare    Drug use: Never           2/25/2025   One time HIV Screening   Previous HIV test? No         2/25/2025   STI Screening   New sexual partner(s) since  "last STI/HIV test? No         2/25/2025   Contraception/Family Planning   Questions about contraception or family planning No       Past Medical History:   Diagnosis Date    Acne      History reviewed. No pertinent surgical history.    Family History   Problem Relation Age of Onset    Hypertension Father     Alcoholism Father     Pernicious anemia Father     Heart Defect Paternal Grandmother         valve replacement         Review of Systems  Constitutional, HEENT, cardiovascular, pulmonary, gi and gu systems are negative, except as otherwise noted.     Objective    Exam  /79 (BP Location: Right arm, Patient Position: Sitting, Cuff Size: Adult Regular)   Pulse 61   Temp 97.2  F (36.2  C) (Skin)   Resp 16   Ht 1.845 m (6' 0.64\")   Wt 87.5 kg (193 lb)   SpO2 99%   BMI 25.72 kg/m     Estimated body mass index is 25.72 kg/m  as calculated from the following:    Height as of this encounter: 1.845 m (6' 0.64\").    Weight as of this encounter: 87.5 kg (193 lb).    Physical Exam  GENERAL: alert and no distress  NECK: no adenopathy, no asymmetry, masses, or scars  RESP: lungs clear to auscultation - no rales, rhonchi or wheezes  CV: regular rate and rhythm, normal S1 S2, no S3 or S4, no murmur, click or rub, no peripheral edema  ABDOMEN: soft, nontender, no hepatosplenomegaly, no masses and bowel sounds normal  MS: no gross musculoskeletal defects noted, no edema        Signed Electronically by: Gregg Robertson MD    "

## 2025-03-02 ENCOUNTER — APPOINTMENT (OUTPATIENT)
Dept: GENERAL RADIOLOGY | Facility: CLINIC | Age: 20
End: 2025-03-02
Attending: STUDENT IN AN ORGANIZED HEALTH CARE EDUCATION/TRAINING PROGRAM
Payer: COMMERCIAL

## 2025-03-02 ENCOUNTER — HOSPITAL ENCOUNTER (EMERGENCY)
Facility: CLINIC | Age: 20
Discharge: HOME | End: 2025-03-02
Attending: STUDENT IN AN ORGANIZED HEALTH CARE EDUCATION/TRAINING PROGRAM | Admitting: STUDENT IN AN ORGANIZED HEALTH CARE EDUCATION/TRAINING PROGRAM
Payer: COMMERCIAL

## 2025-03-02 ENCOUNTER — NURSE TRIAGE (OUTPATIENT)
Dept: NURSING | Facility: CLINIC | Age: 20
End: 2025-03-02
Payer: COMMERCIAL

## 2025-03-02 VITALS
HEART RATE: 72 BPM | WEIGHT: 189 LBS | HEIGHT: 73 IN | BODY MASS INDEX: 25.05 KG/M2 | TEMPERATURE: 98 F | RESPIRATION RATE: 16 BRPM | OXYGEN SATURATION: 99 % | DIASTOLIC BLOOD PRESSURE: 78 MMHG | SYSTOLIC BLOOD PRESSURE: 135 MMHG

## 2025-03-02 DIAGNOSIS — R07.1 CHEST PAIN ON BREATHING: ICD-10-CM

## 2025-03-02 LAB
ALBUMIN SERPL BCG-MCNC: 4.6 G/DL (ref 3.5–5.2)
ALP SERPL-CCNC: 96 U/L (ref 40–150)
ALT SERPL W P-5'-P-CCNC: 20 U/L (ref 0–70)
ANION GAP SERPL CALCULATED.3IONS-SCNC: 9 MMOL/L (ref 7–15)
AST SERPL W P-5'-P-CCNC: 79 U/L (ref 0–45)
ATRIAL RATE - MUSE: 77 BPM
BASOPHILS # BLD AUTO: 0 10E3/UL (ref 0–0.2)
BASOPHILS NFR BLD AUTO: 0 %
BILIRUB SERPL-MCNC: 0.4 MG/DL
BUN SERPL-MCNC: 11 MG/DL (ref 6–20)
CALCIUM SERPL-MCNC: 9.7 MG/DL (ref 8.8–10.4)
CHLORIDE SERPL-SCNC: 101 MMOL/L (ref 98–107)
CREAT SERPL-MCNC: 0.98 MG/DL (ref 0.67–1.17)
D DIMER PPP FEU-MCNC: <0.27 UG/ML FEU (ref 0–0.5)
DIASTOLIC BLOOD PRESSURE - MUSE: NORMAL MMHG
EGFRCR SERPLBLD CKD-EPI 2021: >90 ML/MIN/1.73M2
EOSINOPHIL # BLD AUTO: 0.1 10E3/UL (ref 0–0.7)
EOSINOPHIL NFR BLD AUTO: 1 %
ERYTHROCYTE [DISTWIDTH] IN BLOOD BY AUTOMATED COUNT: 12.6 % (ref 10–15)
GLUCOSE SERPL-MCNC: 112 MG/DL (ref 70–99)
HCO3 SERPL-SCNC: 28 MMOL/L (ref 22–29)
HCT VFR BLD AUTO: 43.7 % (ref 40–53)
HGB BLD-MCNC: 15 G/DL (ref 13.3–17.7)
IMM GRANULOCYTES # BLD: 0 10E3/UL
IMM GRANULOCYTES NFR BLD: 0 %
INTERPRETATION ECG - MUSE: NORMAL
LIPASE SERPL-CCNC: 26 U/L (ref 13–60)
LYMPHOCYTES # BLD AUTO: 1.5 10E3/UL (ref 0.8–5.3)
LYMPHOCYTES NFR BLD AUTO: 25 %
MCH RBC QN AUTO: 29.4 PG (ref 26.5–33)
MCHC RBC AUTO-ENTMCNC: 34.3 G/DL (ref 31.5–36.5)
MCV RBC AUTO: 86 FL (ref 78–100)
MONOCYTES # BLD AUTO: 0.6 10E3/UL (ref 0–1.3)
MONOCYTES NFR BLD AUTO: 10 %
NEUTROPHILS # BLD AUTO: 3.8 10E3/UL (ref 1.6–8.3)
NEUTROPHILS NFR BLD AUTO: 64 %
NRBC # BLD AUTO: 0 10E3/UL
NRBC BLD AUTO-RTO: 0 /100
P AXIS - MUSE: 70 DEGREES
PLATELET # BLD AUTO: 213 10E3/UL (ref 150–450)
POTASSIUM SERPL-SCNC: 4.5 MMOL/L (ref 3.4–5.3)
PR INTERVAL - MUSE: 180 MS
PROT SERPL-MCNC: 7.7 G/DL (ref 6.4–8.3)
QRS DURATION - MUSE: 90 MS
QT - MUSE: 348 MS
QTC - MUSE: 393 MS
R AXIS - MUSE: 34 DEGREES
RBC # BLD AUTO: 5.11 10E6/UL (ref 4.4–5.9)
SODIUM SERPL-SCNC: 138 MMOL/L (ref 135–145)
SYSTOLIC BLOOD PRESSURE - MUSE: NORMAL MMHG
T AXIS - MUSE: 14 DEGREES
TROPONIN T SERPL HS-MCNC: <6 NG/L
VENTRICULAR RATE- MUSE: 77 BPM
WBC # BLD AUTO: 6 10E3/UL (ref 4–11)

## 2025-03-02 PROCEDURE — 71046 X-RAY EXAM CHEST 2 VIEWS: CPT

## 2025-03-02 PROCEDURE — 99285 EMERGENCY DEPT VISIT HI MDM: CPT | Mod: 25 | Performed by: STUDENT IN AN ORGANIZED HEALTH CARE EDUCATION/TRAINING PROGRAM

## 2025-03-02 PROCEDURE — 93005 ELECTROCARDIOGRAM TRACING: CPT | Performed by: STUDENT IN AN ORGANIZED HEALTH CARE EDUCATION/TRAINING PROGRAM

## 2025-03-02 PROCEDURE — 99284 EMERGENCY DEPT VISIT MOD MDM: CPT | Performed by: STUDENT IN AN ORGANIZED HEALTH CARE EDUCATION/TRAINING PROGRAM

## 2025-03-02 PROCEDURE — 85004 AUTOMATED DIFF WBC COUNT: CPT | Performed by: STUDENT IN AN ORGANIZED HEALTH CARE EDUCATION/TRAINING PROGRAM

## 2025-03-02 PROCEDURE — 36415 COLL VENOUS BLD VENIPUNCTURE: CPT | Performed by: STUDENT IN AN ORGANIZED HEALTH CARE EDUCATION/TRAINING PROGRAM

## 2025-03-02 PROCEDURE — 85379 FIBRIN DEGRADATION QUANT: CPT | Performed by: STUDENT IN AN ORGANIZED HEALTH CARE EDUCATION/TRAINING PROGRAM

## 2025-03-02 PROCEDURE — 93010 ELECTROCARDIOGRAM REPORT: CPT | Performed by: STUDENT IN AN ORGANIZED HEALTH CARE EDUCATION/TRAINING PROGRAM

## 2025-03-02 PROCEDURE — 71046 X-RAY EXAM CHEST 2 VIEWS: CPT | Mod: 26 | Performed by: RADIOLOGY

## 2025-03-02 PROCEDURE — 83690 ASSAY OF LIPASE: CPT | Performed by: STUDENT IN AN ORGANIZED HEALTH CARE EDUCATION/TRAINING PROGRAM

## 2025-03-02 PROCEDURE — 82565 ASSAY OF CREATININE: CPT | Performed by: STUDENT IN AN ORGANIZED HEALTH CARE EDUCATION/TRAINING PROGRAM

## 2025-03-02 PROCEDURE — 84484 ASSAY OF TROPONIN QUANT: CPT | Performed by: STUDENT IN AN ORGANIZED HEALTH CARE EDUCATION/TRAINING PROGRAM

## 2025-03-02 RX ORDER — ACETAMINOPHEN 325 MG/1
975 TABLET ORAL ONCE
Status: COMPLETED | OUTPATIENT
Start: 2025-03-02 | End: 2025-03-02

## 2025-03-02 RX ORDER — IBUPROFEN 600 MG/1
600 TABLET, FILM COATED ORAL ONCE
Status: COMPLETED | OUTPATIENT
Start: 2025-03-02 | End: 2025-03-02

## 2025-03-02 ASSESSMENT — COLUMBIA-SUICIDE SEVERITY RATING SCALE - C-SSRS
6. HAVE YOU EVER DONE ANYTHING, STARTED TO DO ANYTHING, OR PREPARED TO DO ANYTHING TO END YOUR LIFE?: NO
2. HAVE YOU ACTUALLY HAD ANY THOUGHTS OF KILLING YOURSELF IN THE PAST MONTH?: NO
1. IN THE PAST MONTH, HAVE YOU WISHED YOU WERE DEAD OR WISHED YOU COULD GO TO SLEEP AND NOT WAKE UP?: NO

## 2025-03-02 ASSESSMENT — ACTIVITIES OF DAILY LIVING (ADL)
ADLS_ACUITY_SCORE: 41
ADLS_ACUITY_SCORE: 41

## 2025-03-02 NOTE — ED PROVIDER NOTES
[place] EMERGENCY DEPARTMENT ([facility])    3/02/25       ED PROVIDER NOTE   Vertical Triage A  History     Chief Complaint   Patient presents with    Chest Pain     Pt presents with c/o chest pain since yesterday. Worsens with deep breaths. Radiates up to the throat.     The history is provided by the patient and medical records.     Gretel Eldridge is a 19 year old male who presents to the emergency department with chest pain that started yesterday.  He describes this as a constant 2/10 dull pressure sensation in the center of his chest that worsens with taking a deep breath.  He can also feel pulsating in his neck like his heart is beating in his neck.    Patient denies any injuries to his chest.  States that the pain came on relatively gradually.  Worse when he takes a deep breath, leans forward or back, twists, turns or moves his arms.  No recent long travel, no lower extremity edema.  No cough, shortness of breath.  No runny nose or congestion.  No recent viral illness.  No nausea or vomiting, diarrhea or constipation.  He has not taken any medications at home for his symptoms.  Called the nursing visit line who recommended he come in for evaluation today    Past Medical History  Past Medical History:   Diagnosis Date    Acne      History reviewed. No pertinent surgical history.  ISOtretinoin (ACCUTANE) 40 MG capsule  triamcinolone (KENALOG) 0.1 % external cream      No Known Allergies  Family History  Family History   Problem Relation Age of Onset    Hypertension Father     Alcoholism Father     Pernicious anemia Father     Heart Defect Paternal Grandmother         valve replacement     Social History   Social History     Tobacco Use    Smoking status: Never    Smokeless tobacco: Never   Substance Use Topics    Alcohol use: Yes     Comment: rare    Drug use: Never      A medically appropriate review of systems was performed with pertinent positives and negatives noted in the HPI, and all other  "systems negative.    Physical Exam   BP: (!) 138/95  Pulse: 84  Temp: 98  F (36.7  C)  Resp: 16  Height: 185.4 cm (6' 1\")  Weight: 85.7 kg (189 lb)  SpO2: 99 %  Physical Exam  GEN: Well appearing, non toxic, cooperative  HEENT: normocephalic and atraumatic, PERRLA, EOMI  CV: well-perfused, normal skin color for ethnicity, regular rate and rhythm, no murmurs rubs or gallop, no reproducible chest wall palpation  PULM: breathing comfortably, in no respiratory distress, clear to auscultation upper and lower lung fields  ABD: nondistended, soft, nontender, negative Anderson, negative Sidney point tenderness  EXT: Full range of motion.  No edema.  NEURO: awake, conversant, grossly normal bilateral upper and lower extremity strength & ROM   SKIN: No rashes, ecchymosis, or lacerations  PSYCH: Calm and cooperative, interactive     ED Course, Procedures, & Data      Procedures            EKG Interpretation:      Interpreted by Malissa Headley MD  Time reviewed: 1538  Symptoms at time of EKG: chest pain   Rhythm: normal sinus   Rate: normal  Axis: normal  Ectopy: none  Conduction: normal  ST Segments/ T Waves: No ST-T wave changes  Q Waves: none  Comparison to prior: No old EKG available    Clinical Impression: normal EKG     Results for orders placed or performed during the hospital encounter of 03/02/25   XR Chest 2 Views     Status: None    Narrative    EXAM: XR CHEST 2 VIEWS  3/2/2025 3:59 PM     HISTORY:  pleuritic chest pain       COMPARISON:  None    FINDINGS:   Trachea is midline. Cardiac silhouette is within normal limits. No  focal consolidative opacity. No pleural effusion. No pneumothorax.    No acute osseous abnormality. Visualized soft tissues and upper  abdomen are unremarkable.       Impression    IMPRESSION:   No acute cardiopulmonary abnormality.    I have personally reviewed the examination and initial interpretation  and I agree with the findings.    CHUN GORDON MD         SYSTEM ID:  [id number] "   Comprehensive metabolic panel     Status: Abnormal   Result Value Ref Range    Sodium 138 135 - 145 mmol/L    Potassium 4.5 3.4 - 5.3 mmol/L    Carbon Dioxide (CO2) 28 22 - 29 mmol/L    Anion Gap 9 7 - 15 mmol/L    Urea Nitrogen 11.0 6.0 - 20.0 mg/dL    Creatinine 0.98 0.67 - 1.17 mg/dL    GFR Estimate >90 >60 mL/min/1.73m2    Calcium 9.7 8.8 - 10.4 mg/dL    Chloride 101 98 - 107 mmol/L    Glucose 112 (H) 70 - 99 mg/dL    Alkaline Phosphatase 96 40 - 150 U/L    AST 79 (H) 0 - 45 U/L    ALT 20 0 - 70 U/L    Protein Total 7.7 6.4 - 8.3 g/dL    Albumin 4.6 3.5 - 5.2 g/dL    Bilirubin Total 0.4 <=1.2 mg/dL   Lipase     Status: Normal   Result Value Ref Range    Lipase 26 13 - 60 U/L   Troponin T, High Sensitivity     Status: Normal   Result Value Ref Range    Troponin T, High Sensitivity <6 <=22 ng/L   D dimer quantitative     Status: Normal   Result Value Ref Range    D-Dimer Quantitative <0.27 0.00 - 0.50 ug/mL FEU    Narrative    This D-dimer assay is intended for use in conjunction with a clinical pretest probability assessment model to exclude pulmonary embolism (PE) and deep venous thrombosis (DVT) in outpatients suspected of PE or DVT. The cut-off value is 0.50 ug/mL FEU.   CBC with platelets and differential     Status: None   Result Value Ref Range    WBC Count 6.0 4.0 - 11.0 10e3/uL    RBC Count 5.11 4.40 - 5.90 10e6/uL    Hemoglobin 15.0 13.3 - 17.7 g/dL    Hematocrit 43.7 40.0 - 53.0 %    MCV 86 78 - 100 fL    MCH 29.4 26.5 - 33.0 pg    MCHC 34.3 31.5 - 36.5 g/dL    RDW 12.6 10.0 - 15.0 %    Platelet Count 213 150 - 450 10e3/uL    % Neutrophils 64 %    % Lymphocytes 25 %    % Monocytes 10 %    % Eosinophils 1 %    % Basophils 0 %    % Immature Granulocytes 0 %    NRBCs per 100 WBC 0 <1 /100    Absolute Neutrophils 3.8 1.6 - 8.3 10e3/uL    Absolute Lymphocytes 1.5 0.8 - 5.3 10e3/uL    Absolute Monocytes 0.6 0.0 - 1.3 10e3/uL    Absolute Eosinophils 0.1 0.0 - 0.7 10e3/uL    Absolute Basophils 0.0 0.0 - 0.2  10e3/uL    Absolute Immature Granulocytes 0.0 <=0.4 10e3/uL    Absolute NRBCs 0.0 10e3/uL   EKG 12 lead     Status: None (Preliminary result)   Result Value Ref Range    Systolic Blood Pressure  mmHg    Diastolic Blood Pressure  mmHg    Ventricular Rate 77 BPM    Atrial Rate 77 BPM    VT Interval 180 ms    QRS Duration 90 ms     ms    QTc 393 ms    P Axis 70 degrees    R AXIS 34 degrees    T Axis 14 degrees    Interpretation ECG Sinus rhythm  Normal ECG      CBC with platelets differential     Status: None    Narrative    The following orders were created for panel order CBC with platelets differential.  Procedure                               Abnormality         Status                     ---------                               -----------         ------                     CBC with platelets and d...[[id number]]                      Final result                 Please view results for these tests on the individual orders.     Medications   acetaminophen (TYLENOL) tablet 975 mg (has no administration in time range)   ibuprofen (ADVIL/MOTRIN) tablet 600 mg (has no administration in time range)     Labs Ordered and Resulted from Time of ED Arrival to Time of ED Departure   COMPREHENSIVE METABOLIC PANEL - Abnormal       Result Value    Sodium 138      Potassium 4.5      Carbon Dioxide (CO2) 28      Anion Gap 9      Urea Nitrogen 11.0      Creatinine 0.98      GFR Estimate >90      Calcium 9.7      Chloride 101      Glucose 112 (*)     Alkaline Phosphatase 96      AST 79 (*)     ALT 20      Protein Total 7.7      Albumin 4.6      Bilirubin Total 0.4     LIPASE - Normal    Lipase 26     TROPONIN T, HIGH SENSITIVITY - Normal    Troponin T, High Sensitivity <6     D DIMER QUANTITATIVE - Normal    D-Dimer Quantitative <0.27     CBC WITH PLATELETS AND DIFFERENTIAL    WBC Count 6.0      RBC Count 5.11      Hemoglobin 15.0      Hematocrit 43.7      MCV 86      MCH 29.4      MCHC 34.3      RDW 12.6      Platelet Count 213       % Neutrophils 64      % Lymphocytes 25      % Monocytes 10      % Eosinophils 1      % Basophils 0      % Immature Granulocytes 0      NRBCs per 100 WBC 0      Absolute Neutrophils 3.8      Absolute Lymphocytes 1.5      Absolute Monocytes 0.6      Absolute Eosinophils 0.1      Absolute Basophils 0.0      Absolute Immature Granulocytes 0.0      Absolute NRBCs 0.0       XR Chest 2 Views   Final Result   IMPRESSION:    No acute cardiopulmonary abnormality.      I have personally reviewed the examination and initial interpretation   and I agree with the findings.      CHUN GORDON MD            SYSTEM ID:  [id number]             Critical care was not performed.     Medical Decision Making  The patient's presentation was of moderate complexity (an acute illness with systemic symptoms).    The patient's evaluation involved:  review of external note(s) from 2 sources (see separate area of note for details)  review of 3+ test result(s) ordered prior to this encounter (see separate area of note for details)  ordering and/or review of 3+ test(s) in this encounter (see separate area of note for details)    The patient's management necessitated moderate risk (prescription drug management including medications given in the ED).    Assessment & Plan    Previously healthy 19-year-old male presents to the department traumatic 1 day of central chest pain worsening with deep inspiration, movement and twisting with no reproducible chest wall pain and otherwise normal evaluation.    No evidence of ischemia noted on his ECG.,  No clinical evidence concerning for DVT or PE, or CHF.  Will plan for lab work, including troponin, x-ray of the chest, and will give Tylenol and Motrin as his symptoms are most consistent with either chest wall strain or less likely pleurisy.  Doubt pericarditis.    5:05 PM workup reassuring. Will discharge with symptomatic treatment    I have reviewed the nursing notes. I have reviewed the findings,  diagnosis, plan and need for follow up with the patient.    New Prescriptions    No medications on file       Final diagnoses:   Chest pain on breathing     Malissa Headley MD     [facility] EMERGENCY DEPARTMENT  3/2/2025     Malissa Headley MD  03/02/25 [id number]

## 2025-03-02 NOTE — TELEPHONE ENCOUNTER
"Nurse Triage SBAR    Is this a 2nd Level Triage? NO    Situation: Patient calling.     Background: Chest pressure.     Assessment: Pt having dull pressure in center of chest which began yesterday. Pressure increases when he takes a deep breath.  Feels \"heart beating in my neck.\" Heart rate via apple watch is 67.  Rates chest  \"discomfort\" 2/10. Discomfort is constant.     Protocol Recommended Disposition:   Call  Now    Recommendation: Call 911. Pt agreeable.           Does the patient meet one of the following criteria for ADS visit consideration? 16+ years old, with an [facility] PCP     TIP  Providers, please consider if this condition is appropriate for management at one of our Acute and Diagnostic Services sites.     If patient is a good candidate, please use dotphrase <dot>triageresponse and select Refer to ADS to document.    Reason for Disposition   Chest pain lasting longer than 5 minutes and ANY of the following:    history of heart disease  (i.e., heart attack, bypass surgery, angina, angioplasty, CHF; not just a heart murmur)    described as crushing, pressure-like, or heavy    age > 44    age > 30 AND at least one cardiac risk factor (i.e., hypertension, diabetes, obesity, smoker or strong family history of heart disease)    not relieved with nitroglycerin    Additional Information   Negative: SEVERE difficulty breathing (e.g., struggling for each breath, speaks in single words)   Negative: Difficult to awaken or acting confused (e.g., disoriented, slurred speech)   Negative: Shock suspected (e.g., cold/pale/clammy skin, too weak to stand, low BP, rapid pulse)   Negative: Passed out (i.e., lost consciousness, collapsed and was not responding)    Protocols used: Chest Pain-A-AH    "

## 2025-03-02 NOTE — ED TRIAGE NOTES
Pt presents with c/o chest pain since yesterday. Worsens with deep breaths. Radiates up to the throat.     Triage Assessment (Adult)       Row Name 03/02/25 1533          Triage Assessment    Airway WDL WDL        Respiratory WDL    Respiratory WDL WDL        Skin Circulation/Temperature WDL    Skin Circulation/Temperature WDL WDL        Cardiac WDL    Cardiac WDL WDL        Peripheral/Neurovascular WDL    Peripheral Neurovascular WDL WDL        Cognitive/Neuro/Behavioral WDL    Cognitive/Neuro/Behavioral WDL WDL

## 2025-03-02 NOTE — DISCHARGE INSTRUCTIONS
You were seen in the emergency room due to chest pain. Does not show any life-threatening causes of your chest pain including no evidence of any damage or injury to your heart, no evidence of a blood clot.    We recommend using Tylenol and Motrin for discomfort in your chest.    Return to the emergency department if you develop severe worsening pain, difficulty breathing or feeling like you are to pass out

## 2025-04-01 ENCOUNTER — TRANSFERRED RECORDS (OUTPATIENT)
Dept: HEALTH INFORMATION MANAGEMENT | Facility: CLINIC | Age: 20
End: 2025-04-01
Payer: COMMERCIAL

## 2025-05-05 ENCOUNTER — TRANSFERRED RECORDS (OUTPATIENT)
Dept: HEALTH INFORMATION MANAGEMENT | Facility: CLINIC | Age: 20
End: 2025-05-05
Payer: COMMERCIAL

## 2025-07-18 ENCOUNTER — TRANSFERRED RECORDS (OUTPATIENT)
Dept: HEALTH INFORMATION MANAGEMENT | Facility: CLINIC | Age: 20
End: 2025-07-18
Payer: COMMERCIAL